# Patient Record
Sex: MALE | Race: WHITE | NOT HISPANIC OR LATINO | Employment: UNEMPLOYED | URBAN - METROPOLITAN AREA
[De-identification: names, ages, dates, MRNs, and addresses within clinical notes are randomized per-mention and may not be internally consistent; named-entity substitution may affect disease eponyms.]

---

## 2019-03-20 ENCOUNTER — TELEPHONE (OUTPATIENT)
Dept: TRANSPLANT | Facility: CLINIC | Age: 74
End: 2019-03-20

## 2019-03-20 NOTE — TELEPHONE ENCOUNTER
Mobility: walking, ADLS, and works when able. Eats without difficulty.    Practicing physician  Umbilical hernia     Spoke to his wife she will email copy of insurance cards.

## 2019-03-21 ENCOUNTER — TELEPHONE (OUTPATIENT)
Dept: TRANSPLANT | Facility: CLINIC | Age: 74
End: 2019-03-21

## 2019-03-21 NOTE — TELEPHONE ENCOUNTER
----- Message from Jamal Oswald sent at 3/21/2019  8:12 AM CDT -----  Have the patients demographic sheet which includes: patients name, mailing address, phone number and insurance information. We will check to see if patient medical records are in Care Everywhere:    By: Jamal Oswald

## 2019-03-28 ENCOUNTER — TELEPHONE (OUTPATIENT)
Dept: TRANSPLANT | Facility: CLINIC | Age: 74
End: 2019-03-28

## 2019-03-28 DIAGNOSIS — K74.69 COMPENSATED HCV CIRRHOSIS: ICD-10-CM

## 2019-03-28 DIAGNOSIS — B19.20 COMPENSATED HCV CIRRHOSIS: ICD-10-CM

## 2019-03-28 NOTE — TELEPHONE ENCOUNTER
Referral received from Self referral  .    Patient with  Hep C Cirrhosis. MELD 22   ICD-10-CM: B19.20, K74.69  Referred for liver transplant for EVALUATION.    Referral completed and forwarded to [facility].          Insurance:   PRIMARY: medicare  ID:[id number]      SECONDARY:[employer]  POLICY: [id number]  Contact #     [employer]  MEMBER ID: [id number]

## 2019-04-03 ENCOUNTER — TELEPHONE (OUTPATIENT)
Dept: TRANSPLANT | Facility: CLINIC | Age: 74
End: 2019-04-03

## 2019-04-03 NOTE — TELEPHONE ENCOUNTER
----- Message from Khadra Damian sent at 4/3/2019 12:46 PM CDT -----  Contact: Maegan diaz's wife  Needs Advice    Reason for call: Calling to check the status of the referral         Communication Preference: [phone]    Additional Information: N/A

## 2019-04-03 NOTE — TELEPHONE ENCOUNTER
Spoke with the patient's wife Maegan advising her that we are currently waiting on the insurance information.  Informed that once that has been received the information from the outside hospitals will be reviewed for the check list once that is completed an appointment will be offered.    The patient's wife stated that she understood the information provided.

## 2019-04-10 ENCOUNTER — TELEPHONE (OUTPATIENT)
Dept: TRANSPLANT | Facility: CLINIC | Age: 74
End: 2019-04-10

## 2019-04-10 NOTE — TELEPHONE ENCOUNTER
Pt called and informed we received financial clr for liver transplant evaluation. Spoke to wife, patient in back ground, reviewed.  Awa Mccurdy, RN  TR[facility]PL[facility]PAT[facility]    [id number] [address]    [place] [address] [phone]

## 2019-04-17 ENCOUNTER — TELEPHONE (OUTPATIENT)
Dept: PREADMISSION TESTING | Facility: HOSPITAL | Age: 74
End: 2019-04-17

## 2019-04-17 ENCOUNTER — TELEPHONE (OUTPATIENT)
Dept: TRANSPLANT | Facility: CLINIC | Age: 74
End: 2019-04-17

## 2019-04-17 NOTE — TELEPHONE ENCOUNTER
----- Message from Jamal Oswald sent at 4/17/2019  2:54 PM CDT -----  Pt needs tracee ppt on 5/8 for liver txp work up

## 2019-04-23 ENCOUNTER — TELEPHONE (OUTPATIENT)
Dept: TRANSPLANT | Facility: CLINIC | Age: 74
End: 2019-04-23

## 2019-04-23 NOTE — TELEPHONE ENCOUNTER
----- Message from Jamal Oswald sent at 4/23/2019  4:18 PM CDT -----  Contact: Patient Wife    Returned call to pt wife, but there was no answer. LVM for her to call back    ----- Message -----  From: Louisa Cuellar  Sent: 4/23/2019   2:20 PM  To: [facility] Liver Referral Pool    Needs Advice    Reason for call: Called to reschedule patient appts on 5/8-5/10.        Communication Preference: Maegan [phone]    Additional Information: n/a

## 2019-04-24 ENCOUNTER — TELEPHONE (OUTPATIENT)
Dept: TRANSPLANT | Facility: CLINIC | Age: 74
End: 2019-04-24

## 2019-04-24 DIAGNOSIS — Z76.82 ORGAN TRANSPLANT CANDIDATE: ICD-10-CM

## 2019-04-24 DIAGNOSIS — K74.60 CHRONIC HEPATITIS C WITH CIRRHOSIS: Primary | ICD-10-CM

## 2019-04-24 DIAGNOSIS — B18.2 CHRONIC HEPATITIS C WITH CIRRHOSIS: Primary | ICD-10-CM

## 2019-04-24 NOTE — TELEPHONE ENCOUNTER
----- Message from Jamal Oswald sent at 4/23/2019  5:00 PM CDT -----    Returned pt call and will re/mateus appt for the week of 5/15-5/17    ----- Message -----  From: Barbara Taylor  Sent: 4/23/2019   4:42 PM  To: [facility] Liver Referral Pool    Patient Returning Call from [facility]    Who Left Message for Patient: Jamal Oswald  Communication Preference: [phone]  Additional Information: pt can do appt on 5/15-17; 5/22-24 or 5/29-31--pls contact pt with best date

## 2019-04-26 DIAGNOSIS — K74.60 HEPATIC CIRRHOSIS, UNSPECIFIED HEPATIC CIRRHOSIS TYPE, UNSPECIFIED WHETHER ASCITES PRESENT: Primary | ICD-10-CM

## 2019-04-30 ENCOUNTER — TELEPHONE (OUTPATIENT)
Dept: TRANSPLANT | Facility: CLINIC | Age: 74
End: 2019-04-30

## 2019-04-30 NOTE — TELEPHONE ENCOUNTER
----- Message from Jamal Oswald sent at 4/30/2019 11:09 AM CDT -----    Called and sp to pt wife and went over appts [facility]'ed for 5/15-5/17. Will e-mail her a copy of the appts.

## 2019-05-14 ENCOUNTER — TELEPHONE (OUTPATIENT)
Dept: TRANSPLANT | Facility: CLINIC | Age: 74
End: 2019-05-14

## 2019-05-14 NOTE — TELEPHONE ENCOUNTER
Pt wants to bring med list in pt doesn't want to give medication over the phone he will bring the list in all other  questions are answered

## 2019-05-15 ENCOUNTER — DOCUMENTATION ONLY (OUTPATIENT)
Dept: TRANSPLANT | Facility: CLINIC | Age: 74
End: 2019-05-15

## 2019-05-15 ENCOUNTER — HOSPITAL ENCOUNTER (OUTPATIENT)
Dept: RADIOLOGY | Facility: CLINIC | Age: 74
Discharge: HOME | End: 2019-05-15
Attending: INTERNAL MEDICINE
Payer: MEDICARE

## 2019-05-15 ENCOUNTER — OFFICE VISIT (OUTPATIENT)
Dept: TRANSPLANT | Facility: CLINIC | Age: 74
End: 2019-05-15
Payer: MEDICARE

## 2019-05-15 ENCOUNTER — SOCIAL WORK (OUTPATIENT)
Dept: TRANSPLANT | Facility: CLINIC | Age: 74
End: 2019-05-15
Payer: MEDICARE

## 2019-05-15 ENCOUNTER — TELEPHONE (OUTPATIENT)
Dept: TRANSPLANT | Facility: CLINIC | Age: 74
End: 2019-05-15

## 2019-05-15 ENCOUNTER — HOSPITAL ENCOUNTER (OUTPATIENT)
Dept: PREADMISSION TESTING | Facility: HOSPITAL | Age: 74
Discharge: HOME | End: 2019-05-15
Attending: ANESTHESIOLOGY
Payer: MEDICARE

## 2019-05-15 ENCOUNTER — NUTRITION (OUTPATIENT)
Dept: TRANSPLANT | Facility: CLINIC | Age: 74
End: 2019-05-15
Payer: MEDICARE

## 2019-05-15 VITALS
HEART RATE: 58 BPM | BODY MASS INDEX: 25.71 KG/M2 | DIASTOLIC BLOOD PRESSURE: 63 MMHG | TEMPERATURE: 98 F | RESPIRATION RATE: 18 BRPM | HEIGHT: 73 IN | OXYGEN SATURATION: 99 % | SYSTOLIC BLOOD PRESSURE: 141 MMHG | WEIGHT: 194 LBS

## 2019-05-15 VITALS
OXYGEN SATURATION: 100 % | SYSTOLIC BLOOD PRESSURE: 133 MMHG | HEIGHT: 73 IN | WEIGHT: 195.13 LBS | WEIGHT: 195.13 LBS | HEIGHT: 73 IN | RESPIRATION RATE: 17 BRPM | DIASTOLIC BLOOD PRESSURE: 55 MMHG | HEART RATE: 54 BPM | BODY MASS INDEX: 25.86 KG/M2 | BODY MASS INDEX: 25.86 KG/M2

## 2019-05-15 DIAGNOSIS — Z76.82 ORGAN TRANSPLANT CANDIDATE: Primary | ICD-10-CM

## 2019-05-15 DIAGNOSIS — Z76.82 ORGAN TRANSPLANT CANDIDATE: ICD-10-CM

## 2019-05-15 DIAGNOSIS — B19.20 COMPENSATED HCV CIRRHOSIS: ICD-10-CM

## 2019-05-15 DIAGNOSIS — K74.69 COMPENSATED HCV CIRRHOSIS: ICD-10-CM

## 2019-05-15 DIAGNOSIS — K74.60 CHRONIC HEPATITIS C WITH CIRRHOSIS: ICD-10-CM

## 2019-05-15 DIAGNOSIS — B18.2 CHRONIC HEPATITIS C WITH CIRRHOSIS: ICD-10-CM

## 2019-05-15 DIAGNOSIS — K74.69 DECOMPENSATED CIRRHOSIS RELATED TO HEPATITIS C VIRUS (HCV): ICD-10-CM

## 2019-05-15 DIAGNOSIS — B19.20 DECOMPENSATED CIRRHOSIS RELATED TO HEPATITIS C VIRUS (HCV): ICD-10-CM

## 2019-05-15 DIAGNOSIS — Z01.818 PRE-TRANSPLANT EVALUATION FOR LIVER TRANSPLANT: Primary | ICD-10-CM

## 2019-05-15 PROBLEM — D61.818 PANCYTOPENIA: Status: ACTIVE | Noted: 2019-05-15

## 2019-05-15 PROBLEM — N20.0 RENAL CALCULI: Status: ACTIVE | Noted: 2019-05-15

## 2019-05-15 PROBLEM — N40.1 BENIGN PROSTATIC HYPERPLASIA WITH LOWER URINARY TRACT SYMPTOMS: Status: ACTIVE | Noted: 2019-05-15

## 2019-05-15 LAB
AMPHET+METHAMPHET UR QL: NEGATIVE
BACTERIA #/AREA URNS AUTO: ABNORMAL /HPF
BARBITURATES UR QL SCN>200 NG/ML: NEGATIVE
BENZODIAZ UR QL SCN>200 NG/ML: NEGATIVE
BILIRUB UR QL STRIP: NEGATIVE
BZE UR QL SCN: NEGATIVE
CANNABINOIDS UR QL SCN: NEGATIVE
CLARITY UR REFRACT.AUTO: CLEAR
COLOR UR AUTO: YELLOW
CREAT UR-MCNC: 43 MG/DL (ref 23–375)
ETHANOL UR-MCNC: <10 MG/DL
GLUCOSE UR QL STRIP: NEGATIVE
HGB UR QL STRIP: ABNORMAL
HYALINE CASTS UR QL AUTO: 8 /LPF
KETONES UR QL STRIP: NEGATIVE
LEUKOCYTE ESTERASE UR QL STRIP: NEGATIVE
METHADONE UR QL SCN>300 NG/ML: NEGATIVE
MICROSCOPIC COMMENT: ABNORMAL
NITRITE UR QL STRIP: NEGATIVE
OPIATES UR QL SCN: NEGATIVE
PCP UR QL SCN>25 NG/ML: NEGATIVE
PH UR STRIP: 6 [PH] (ref 5–8)
PROT UR QL STRIP: NEGATIVE
RBC #/AREA URNS AUTO: 6 /HPF (ref 0–4)
SP GR UR STRIP: 1.01 (ref 1–1.03)
TOXICOLOGY INFORMATION: NORMAL
URN SPEC COLLECT METH UR: ABNORMAL
WBC #/AREA URNS AUTO: 1 /HPF (ref 0–5)

## 2019-05-15 PROCEDURE — 99205 OFFICE O/P NEW HI 60 MIN: CPT | Mod: S$PBB,TXP,, | Performed by: INTERNAL MEDICINE

## 2019-05-15 PROCEDURE — 77080 DXA BONE DENSITY AXIAL: CPT | Mod: 26,,, | Performed by: INTERNAL MEDICINE

## 2019-05-15 PROCEDURE — 77080 DEXA BONE DENSITY SPINE HIP: ICD-10-PCS | Mod: 26,,, | Performed by: INTERNAL MEDICINE

## 2019-05-15 PROCEDURE — 77080 DXA BONE DENSITY AXIAL: CPT | Mod: TC,TXP

## 2019-05-15 PROCEDURE — 99999 PR PBB SHADOW E&M-EST. PATIENT-LVL I: CPT | Mod: PBBFAC,TXP,, | Performed by: DIETITIAN, REGISTERED

## 2019-05-15 PROCEDURE — 99214 OFFICE O/P EST MOD 30 MIN: CPT | Mod: PBBFAC,25,27,TXP | Performed by: INTERNAL MEDICINE

## 2019-05-15 PROCEDURE — 80307 DRUG TEST PRSMV CHEM ANLYZR: CPT | Mod: TXP

## 2019-05-15 PROCEDURE — 99999 PR PBB SHADOW E&M-EST. PATIENT-LVL I: ICD-10-PCS | Mod: PBBFAC,TXP,, | Performed by: DIETITIAN, REGISTERED

## 2019-05-15 PROCEDURE — 99205 PR OFFICE/OUTPT VISIT, NEW, LEVL V, 60-74 MIN: ICD-10-PCS | Mod: S$PBB,TXP,, | Performed by: INTERNAL MEDICINE

## 2019-05-15 PROCEDURE — 99999 PR PBB SHADOW E&M-EST. PATIENT-LVL IV: ICD-10-PCS | Mod: PBBFAC,TXP,, | Performed by: INTERNAL MEDICINE

## 2019-05-15 PROCEDURE — 81001 URINALYSIS AUTO W/SCOPE: CPT | Mod: TXP

## 2019-05-15 PROCEDURE — 99211 OFF/OP EST MAY X REQ PHY/QHP: CPT | Mod: PBBFAC,25,TXP | Performed by: DIETITIAN, REGISTERED

## 2019-05-15 PROCEDURE — 99999 PR PBB SHADOW E&M-EST. PATIENT-LVL IV: CPT | Mod: PBBFAC,TXP,, | Performed by: INTERNAL MEDICINE

## 2019-05-15 PROCEDURE — 97802 MEDICAL NUTRITION INDIV IN: CPT | Mod: PBBFAC,TXP | Performed by: DIETITIAN, REGISTERED

## 2019-05-15 RX ORDER — RIFAXIMIN 550 MG/1
550 TABLET ORAL 2 TIMES DAILY
COMMUNITY
Start: 2019-05-02

## 2019-05-15 RX ORDER — SPIRONOLACTONE 100 MG/1
TABLET, FILM COATED ORAL
Refills: 3 | COMMUNITY
Start: 2019-04-09

## 2019-05-15 RX ORDER — GUAIFENESIN 1200 MG
325 TABLET, EXTENDED RELEASE 12 HR ORAL EVERY 6 HOURS PRN
COMMUNITY

## 2019-05-15 RX ORDER — CALCIUM CARB/VITAMIN D3/VIT K1 500-500-40
10000 TABLET,CHEWABLE ORAL
COMMUNITY
End: 2019-05-16 | Stop reason: DRUGHIGH

## 2019-05-15 RX ORDER — CYANOCOBALAMIN (VITAMIN B-12) 1000 MCG
1 TABLET, EXTENDED RELEASE ORAL
COMMUNITY

## 2019-05-15 RX ORDER — METOLAZONE 2.5 MG/1
2.5 TABLET ORAL DAILY PRN
COMMUNITY
Start: 2019-02-27

## 2019-05-15 RX ORDER — UREA 10 %
220 LOTION (ML) TOPICAL
COMMUNITY
End: 2019-05-16 | Stop reason: DRUGHIGH

## 2019-05-15 RX ORDER — LORATADINE 10 MG/1
10 TABLET ORAL DAILY PRN
COMMUNITY

## 2019-05-15 RX ORDER — IBUPROFEN 100 MG/5ML
2000 SUSPENSION, ORAL (FINAL DOSE FORM) ORAL DAILY
COMMUNITY

## 2019-05-15 RX ORDER — LACTULOSE 10 G/10G
15 SOLUTION ORAL
COMMUNITY
End: 2019-05-16 | Stop reason: DRUGHIGH

## 2019-05-15 RX ORDER — MAGNESIUM 200 MG
200 TABLET ORAL DAILY
COMMUNITY

## 2019-05-15 RX ORDER — FUROSEMIDE 40 MG/1
40 TABLET ORAL DAILY
Refills: 3 | COMMUNITY
Start: 2019-04-09

## 2019-05-15 RX ORDER — PERPHENAZINE 16 MG
2 TABLET ORAL DAILY
COMMUNITY

## 2019-05-15 NOTE — TELEPHONE ENCOUNTER
----- Message from Barbara Taylor sent at 5/15/2019 11:33 AM CDT -----  Patient Returning Call from [facility]    Who Left Message for Patient: unkn/no msg  Communication Preference: [phone]  Additional Information:

## 2019-05-15 NOTE — Clinical Note
Please forward clinic note to primary hepatologist in [place].  Not currently listed in our system.  Cleared to complete transplant evaluation

## 2019-05-15 NOTE — PATIENT INSTRUCTIONS
You are undergoing liver transplant evaluation.    MELD score is 22    No change in diuretics but would avoid metolazone use at this time and follow up with primary hepatologist for labs in 1-2 weeks to closely monitor renal function    We will discuss your case in liver selection once evaluation is complete.    Recommend avoiding prostate surgery prior to transplantation unless absolutely necessary.    You may use tonic water for muscle cramps associated with diuretic use.     Return to clinic in 6 months

## 2019-05-15 NOTE — PROGRESS NOTES
Transplant Hepatology  Liver Transplant Recipient Evaluation      Consultation started: 5/15/2019 at 8:06 AM     Original Referring Provider: Aaareferral Self  Current Corresponding Physician:     FRANK Native Liver Diagnosis: Cirrhosis: Type C    Reason for Visit: evaluation for liver transplant     Subjective:     Roberto Izaguirre is a 73 y.o. 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  He is accompanied by his wife.      Patient reports being diagnosed with hepatitis C after being found to have abnormal liver tests during an insurance evaluation in the 1980s.  He states that symptoms of irritability and memory loss were also present.  Evaluation revealed presence of non-A-non-B viral hepatitis.  The patient underwent successful treatment with 48 weeks of interferon approximately 8 years ago.      Despite cure of chronic hepatitis C, the patient has experienced decompensation of his liver disease.  This includes ascites and volume overload.  He is currently on diuretic therapy with lasix 40mg, spironolactone 100mg daily and prn metolazone (using approximately every 10 days).  He reports no history of LVP but has undergone TIPS for volume issues in January 2013.  The patient has also had issues with hepatic hydrothorax and required chest tube placement in 2012 prior to TIPS.  HE has worsened since TIPS placement.  He is currently on lactulose with 5 BMs daily and rifaximin.  Variceal bleed in 2004 and 2012.  He has not had further bleeding following TIPS.   The patient is also known to have nonocclusive PVT.      The patient has had 3 TIPS revision with most recent in January 2019.  Most recent US with increased velocities and concern for TIPS stenosis.      The patient has had 9 hospitalizations since November 2018.  His wife reports recurrent admissions for HE and recently precipitated by E coli bacteremia.      The patient resides in [facility].  He has been listed for liver transplant at [facility].  Has had 1 admission for liver transplant over the last 6 months but cancelled due to donor quality.  General MELD score is 19-21 with significant rise during episodes of infection or other precipitants for HE.   Also planned for evaluation at [facility] next month.      PMH:  BPH  Renal calculi  Umbilical hernia  Decompensated hepatitis C cirrhosis  CKD, baseline creatinine 1.5  Pancytopenia related to liver disease   PVT    PSH:    R inguinal hernia repair  Vasectomy   Tonsillectomy  Placement of urolift   No upper abdominal surgeries    FH: no liver disease     SH:   , lives together   General surgeon, retired from practice a few months ago  No tobacco, alcohol, or illicit drugs  (CBD use)     Review of Systems   Constitutional: Positive for fatigue. Negative for activity change, appetite change, chills and unexpected weight change.   HENT: Negative for hearing loss and sore throat.    Eyes: Negative for visual disturbance.   Respiratory: Negative for shortness of breath.    Cardiovascular: Positive for leg swelling. Negative for chest pain.   Gastrointestinal: Positive for abdominal distention. Negative for abdominal pain, nausea and vomiting.   Genitourinary: Positive for difficulty urinating.   Musculoskeletal: Negative for gait problem.   Skin: Negative for rash.   Neurological: Negative for weakness and headaches.   Hematological: Negative for adenopathy. Does not bruise/bleed easily.   Psychiatric/Behavioral: Positive for confusion and decreased concentration.       Objective:   Physical Exam   Constitutional: He is oriented to person, place, and time. He appears well-developed and well-nourished. No distress.   Ambulates and gets onto exam table without assistance   HENT:   Head: Normocephalic and atraumatic.   Mouth/Throat: Oropharynx is clear and moist. No oropharyngeal exudate.   Eyes: Pupils are equal, round, and reactive to light. Conjunctivae are normal. Scleral icterus  is present.   Neck: Normal range of motion. Neck supple. No thyromegaly present.   Cardiovascular: Normal rate, regular rhythm and normal heart sounds. Exam reveals no gallop and no friction rub.   No murmur heard.  Pulmonary/Chest: Effort normal and breath sounds normal. No respiratory distress. He has no wheezes. He has no rales.   Abdominal: Soft. Bowel sounds are normal. He exhibits distension. There is no tenderness. A hernia (umbilical, reducible) is present.   Musculoskeletal: Normal range of motion. He exhibits edema (2+ BLE).   Lymphadenopathy:     He has no cervical adenopathy.   Neurological: He is alert and oriented to person, place, and time. No cranial nerve deficit.   Mental status normal, no asterixis   Skin: Skin is warm and dry. No erythema.   Psychiatric: He has a normal mood and affect. His behavior is normal.   Vitals reviewed.     MELD-Na score: 22 at 5/15/2019  7:30 AM  MELD score: 21 at 5/15/2019  7:30 AM  Calculated from:  Serum Creatinine: 1.7 mg/dL at 5/15/2019  7:30 AM  Serum Sodium: 135 mmol/L at 5/15/2019  7:30 AM  Total Bilirubin: 3.1 mg/dL at 5/15/2019  7:30 AM  INR(ratio): 1.6 at 5/15/2019  7:30 AM  Age: 73 years     Lab Results   Component Value Date     (H) 05/15/2019    BUN 49 (H) 05/15/2019    CREATININE 1.7 (H) 05/15/2019    CALCIUM 9.0 05/15/2019     (L) 05/15/2019    K 3.8 05/15/2019     05/15/2019    PROT 5.7 (L) 05/15/2019    CO2 21 (L) 05/15/2019    WBC 3.25 (L) 05/15/2019    RBC 3.33 (L) 05/15/2019    HGB 11.6 (L) 05/15/2019    HCT 33.8 (L) 05/15/2019    PLT 23 (LL) 05/15/2019     Lab Results   Component Value Date    ALBUMIN 2.8 (L) 05/15/2019    BILITOT 3.1 (H) 05/15/2019    AST 36 05/15/2019    ALT 29 05/15/2019    ALKPHOS 131 05/15/2019    LABPROT 15.8 (H) 05/15/2019    INR 1.6 (H) 05/15/2019    PSA 0.23 05/15/2019       Diagnostics: EMR reviewed    Transplant Hepatology - Candidacy   Assessment/Plan:     Transplant Candidacy: Roberto Izaguirre is a  73 y.o. male with ESLD secondary to hepatitis C here for evaluation for possible OLT.  In my opinion, he is a good candidate for liver transplant.  He will be presented to selection committee after all tests and evaluations are complete.    Patient has medical indications for transplant with multiple complications of portal hypertension including splenomegaly, pancytopenia, volume overload, hepatic hydrothorax and HE.  He is s/p TIPS and appears to have good function.    There are limited medical issues outside of his liver disease.  Increased surgical complexity with PVT and TIPS, this will be reviewed during surgical consultation.  There are no obvious psychosocial concerns.    CKD:  Patient has evidence of volume overload despite small rise in creatinine on current diuretic dose.  Will defer titration to primary hepatologist but would recommend check labs in 1-2 weeks to monitor if renal function is worsening with ongoing use of diuretic therapy.  Currently does not require consideration of liver-kidney transplant if renal function can be maintained.    Functional status:  Patient has BMI 25 and appropriate functional status.  Needs ongoing attention to good nutrition and continued physical activity.  No concerns at this time.     RTC in 6 months     Ness Cotto MD         [facility] Patient Status  Functional Status: 70% - Cares for self: unable to carry on normal activity or active work  Physical Capacity: No Limitations    Outside Records Request: none

## 2019-05-15 NOTE — PROGRESS NOTES
"TRANSPLANT NUTRITIONAL ASSESSMENT    Referring Provider: Ness Cotto MD    Reason for Visit: Pre-liver transplant work-up    Age: 73 y.o.  Sex: male    Patient Active Problem List   Diagnosis    Compensated HCV cirrhosis     No past medical history on file.  Lab Results   Component Value Date     (H) 05/15/2019    K 3.8 05/15/2019    ALBUMIN 2.8 (L) 05/15/2019    HGBA1C 6.4 (H) 05/15/2019    CALCIUM 9.0 05/15/2019     Other Pertinent Labs: none    Current Outpatient Medications   Medication Sig    acetaminophen 325 mg Cap Take by mouth.    alpha lipoic acid 600 mg Cap Take 2 capsules by mouth.    ascorbic acid, vitamin C, (VITAMIN C) 1000 MG tablet Take 500 mg by mouth.    CANNABIDIOL, CBD, EXTRACT ORAL Take by mouth.    cholecalciferol, vitamin D3, 400 unit Cap Take 10,000 Units by mouth.    furosemide (LASIX) 40 MG tablet     iron, carbonyl 45 mg Tab Take 1 tablet by mouth.    lactulose (CEPHULAC) 10 gram packet Take 15 g by mouth.    loratadine (CLARITIN) 10 mg tablet Take 10 mg by mouth.    magnesium 200 mg Tab Take by mouth once.    metOLazone (ZAROXOLYN) 2.5 MG tablet Take 2.5 mg by mouth.    mirabegron (MYRBETRIQ) 50 mg Tb24 Take by mouth.    multivitamin capsule Take 1 capsule by mouth once daily.    ranitidine (ZANTAC) 150 MG tablet Take 150 mg by mouth 2 (two) times daily.    spironolactone (ALDACTONE) 100 MG tablet     XIFAXAN 550 mg Tab     zinc sulfate 220 mg Tab tablet Take 220 mg by mouth.     No current facility-administered medications for this visit.      Allergies: Patient has no known allergies.    Ht Readings from Last 1 Encounters:   05/15/19 6' 1" (1.854 m)     Wt Readings from Last 1 Encounters:   05/15/19 88.5 kg (195 lb 1.7 oz)      BMI: Body mass index is 25.74 kg/m².    Usual Weight: 185-190 lb  Weight Change/Time: prior to fluid retention/fluctuation issues weight was around 175-180 lb  Current Diet: low sodium, vegetarian  Appetite/Current Intake: " poor  Exercise/Physical Activity: functional in ADLs, not much exercise/walking  Nutritional/Herbal Supplements: 1 Boost most days, may add pea protein powder to foods, Vit D, multi vit  Potential Food/Medication Interactions: no grapefruit - xifaxan  Chewing/Swallowing Problems: none  Symptoms: none  Assessment of Lab Values: Glu 210, Alb 2.8, Ha1c 6.4  Support System: spouse present and closely involved in pt's nutrition/diet regimen    Estimated Kcal Need: 4563-0970 kcal (25-30 kcal/kg)  Estimated Protein Need:  gm (1-1.5 gmk/kg)    Nutritional History:   Pt present with spouse today, they are here from [place]. Pt has been through other Tx evaluations before today. Pt reports having a poor appetite, occasional nausea, no vomiting/diarrhea/constipation. Pt is less interested in eating in general. Spouse cooks and tries to make various foods/meals pt may like. They have been following vegetarian diet for many years, also low sodium. Pt does not eat pickles, regular chips, canned items. Pt reported the following diet recall:  B: granola, almond milk, orange or banana or blueberries, or 2 eggs w/ toast or muffin, orange juice/green tea/1/2 cup coffee w/ sugar & 1/2&1/2  L: sandwich w/ gonzalez/veggie-meat/lettuce/tomato/avocado or pasta w/ vegetables  & beans, maybe PB &J sandwich; out to eat rarely Mexican/Mosotho/garden burger about 1 x/week, w/ Dr Pepper  D: beans, salad, pasta, lentil loaf or cottage cheese loaf  Beverages: water, green tea, juice & soda blend drink  Snack: Boost    Nutritional Diagnoses  Problem: food- and nutrition-related knowledge deficit  Etiology: r/t no prior edu on adequate protein intake/goal per day intake   Symptoms: aeb diet recall, questions    Educational Need? yes  Barriers: none identified  Discussed with: patient and spouse  Interventions: Patient taught nutrition information regarding Pre-liver transplant work-up.    Provided education on protein content in foods, goal intake per  day, suggestions for ways to reach protein intake goal; nutrition supplements, snacks.   Reinforced low sodium diet recommendations.  Goals/Recommendations: diet adherence, small frequent meals and snacks and consumption of aleah nutritional supplements  Actions Taken: instruct/provide written information  Strategies Used: problem solving, goal setting, motivational interviewing  Patient and/or family comprehend instructions: yes , adherence expected  Outcome: Verbalizes understanding  Monitoring:     Contact information provided, will f/u in clinic and communicate with the care team as needed.     Counseling Time: 30 minutes

## 2019-05-15 NOTE — PROGRESS NOTES
"Transplant Recipient Adult Psychosocial Assessment    SW completed assessment with pt and pt's spouse / primary caregiver in clinic.    Pt reports being activated on liver transplant wait lists by [facility] since  and by [facility] since .  Pt also reports being scheduled to start liver transplant evaluation at [facility] in [place], [place] starting on 19.    Roberto Izaguirre  [id number] [address]  Telephone Information:   Mobile [phone]   Home  [phone] (home)  Work  There is no work phone number on file.  E-mail  [email or web address]    Sex: male  YOB: 1945  Age: 73 y.o.    Encounter Date: 5/15/2019  [place] Citizen: yes  Primary Language: Portuguese   Needed: no    Emergency Contact:  Name: Maegan Izaguirre (Cherie)  Relationship: wife  Address: same as patient  Phone Numbers: [phone] (mobile)    Family/Social Support:   Number of dependents/: none - pt reports having 2 cats who are both indoor and outdoor pets.  Pt/spouse plans to make arrangements to have family / friends care for pets (temporarily or permanently) as necessary.  Marital history: Pt reports marrying once and has been  for 45 years.  Other family dynamics: Pt's parents are .  Pt has 2 adult children (son Ricardo Izaguirre who lives in [place] and daughter Addie Benito who lives in [place], [place]).  Pt has 3 grandchildren whom he does not see often due to pt and children living far apart.  Pt also has 3 siblings (2 are physicians) who live in [place] and [place], [place].    Household Composition:  Name: Roberto Izaguirre  Age: 73  Relationship: patient  Does person drive? no    Name: Maegan Beard" Dmitriy  Age: 68  Relationship: wife  Does person drive? yes    Do you and your caregivers have access to reliable transportation? yes  PRIMARY CAREGIVER: Maegan "Rebecca" Dmitriy, pt's wife, will be primary caregiver, phone " "number [phone].      provided in-depth information to patient and caregiver regarding pre- and post-transplant caregiver role.   strongly encourages patient and caregiver to have concrete plan regarding post-transplant care giving, including back-up caregiver(s) to ensure care giving needs are met as needed.    Patient and Caregiver states understanding all aspects of caregiver role/commitment and is able/willing/committed to being caregiver to the fullest extent necessary.    Patient and Caregiver verbalizes understanding of the education provided today and caregiver responsibilities.         remains available. Patient and Caregiver agree to contact  in a timely manner if concerns arise.      Able to take time off work without financial concerns: n/a - Pt's wife is retired from nursing.     Additional Significant Others who will Assist with Transplant:  Name: Kristianlannymoe "Arnav Swanson - confirmed caregiver availability via phone  Age: 68  City: [place] State: [place]  Relationship: friend  Does person drive? yes   Phone Number: [phone] (mobile)    Living Will: yes  Healthcare Power of : yes - Pt's wife is 1st listed HCPA and pt's son Ricardo Izaguirre is 2nd listed HCPA.  Advance Directives on file: <<no information> per medical record.  Verbally reviewed LW/HCPA information.   provided patient with copy of LW/HCPA documents and provided education on completion of forms.    Living Donors: No.    Highest Education Level: Post-College Graduate Degree - Pt is an MD and practices General Medicine (outpatient).  Reading Ability: college  Reports difficulty with: seeing - wears readers.  Pt also reports slower reading comprehension.  Learns Best By:  Combination of written, verbal, and demonstrated information     Status: no  VA Benefits: no     Working for Income: No  If no, reason not working: Demands of Treatment  Patient is " employed as outpatient physician practicing General Medicine.  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  Pt reports currently being on medical leave for the past 2 or 3 months due to liver disease and transplant workup.  Pt denies currently having any vacation time or STD/LTD benefits due to part-time employment status.    Spouse/Significant Other Employment: currently unemployed but has worked as a nurse in the past    Disabled: no    Monthly Income:   Reitrement: $2,415 (pt's income); pt's spouse reports own SSR income as $1,000 / month.  Pt and spouse also report each having an MIKALA ($6000 in pt's MIKALA and $5000 in wife's MIKALA).  Per spouse, pt also has about $25,000 in investments for financial backup support.  Able to afford all costs now and if transplanted, including medications: yes - pt reports getting prescription cost assistance through his employer ([employer]) for Xifaxan.  Pt's current monthly medication copays totaling $140 after having met deductible this year.  Patient and Caregiver verbalizes understanding of personal responsibilities related to transplant costs and the importance of having a financial plan to ensure that patients transplant costs are fully covered.      provided fundraising information/education.  Patient and Caregiver verbalizes understanding.   remains available.    Insurance:   Payor/Plan Subscr  Sex Relation Sub. Ins. ID Effective Group Num   1. MEDICARE - MEALEXSANDRA ADAMS 1945 Male Self [id number]                                    [address]   2. AETNA - MEDIC* ALEXSANDRA IBARRA 1945 Male  [id number]                                    [address] [address]     Primary Insurance (for UNOS reporting): Public Insurance - Medicare FFS (Fee For Service) - Medicare A&B with Hum[employer] Medicare Part D  Secondary  Insurance (for UNOS reporting): Public Insurance - Medicare FFS (Fee For Service) - Aetna Medicare Supplement  Patient and Caregiver verbalizes clear understanding that patient may experience difficulty obtaining and/or be denied insurance coverage post-surgery. This includes and is not limited to disability insurance, life insurance, health insurance, burial insurance, long term care insurance, and other insurances.    Patient and Caregiver also reports understanding that future health concerns related to or unrelated to transplantation may not be covered by patient's insurance.  Resources and information provided and reviewed.      Patient and Caregiver provides verbal permission to release any necessary information to outside resources for patient care and discharge planning.  Resources and information provided are reviewed.      Dialysis Adherence:  Pt denies having ever received dialysis treatment in the past.    Infusion Service: patient utilizing? no  Home Health: patient utilizing? no  DME: yes - pt owns a cane, walker, wheelchair, commode, bp cuff, and thermometer at home (all DME obtained from other family members)  Pulmonary/Cardiac Rehab: no   ADLS:  Pt reports being independent and not requiring any assistance to perform ADLs.  Pt reports ambulating independently without use of DME.  Pt does not drive.    Adherence:   Pt/caregiver reports pt has exhibited good adherence to medication regimen, appointment schedule, and medical recommendations in the past.  Adherence education and counseling provided.     Per History Section:  Past Medical History:   Diagnosis Date    BPH (benign prostatic hyperplasia)     Cirrhosis     Hepatitis     hep C s/p cure    Renal calculi      Social History     Tobacco Use    Smoking status: Never Smoker   Substance Use Topics    Alcohol use: Not Currently     Social History     Substance and Sexual Activity   Drug Use Never     Social History     Substance and Sexual  Activity   Sexual Activity Yes    Partners: Female       Per Today's Psychosocial:  Tobacco: none, patient denies any use ever  Alcohol: none, patient denies any use ever  Illicit Drugs/Non-prescribed Medications: none, patient denies any use ever    Patient and Caregiver states clear understanding of the potential impact of substance use as it relates to transplant candidacy and is aware of possible random substance screening.  Substance abstinence/cessation counseling, education and resources provided and reviewed.     Arrests/DWI/Treatment/Rehab: patient denies    Psychiatric History:    Mental Health: Pt denies history of any mental health symptoms or diagnoses.  Psychiatrist/Counselor: Pt denies ever receiving care from psychiatrist/counselor.  Medications:  Pt denies ever being prescribed medication for mental health treatment.  Suicide/Homicide Issues: Pt denies any past and/or present SI/HI.   Safety at home: Pt denies having any past or present concerns regarding safety at home including but not limited to physical/emotional/sexual abuse, neglect, or exploitation.    Knowledge: Patient and Caregiver states having clear understanding and realistic expectations regarding the potential risks and potential benefits of organ transplantation and organ donation, agrees to discuss with health care team members and support system members and to utilize available resources and express questions and/or concerns in order to further facilitate the pt informed decision-making.  Resources and information provided and reviewed.     Patient and Caregiver is aware of [facility]'s affiliation and/or partnership with agencies in home health care, LTAC, SNF, [facility] and other hospitals and clinics.    Understanding: Patient and Caregiver reports having a clear understanding of the many lifetime commitments involved with being a transplant recipient, including costs, compliance, medications, lab work, procedures, appointments,  "concrete and financial planning, preparedness, timely and appropriate communication of concerns, abstinence (ETOH, tobacco, illicit non-prescribed drugs), adherence to all health care team recommendations, support system and caregiver involvement, appropriate and timely resource utilization and follow-through, mental health counseling as needed/recommended, and patient and caregiver responsibilities.  Social Service Handbook, resources and detailed educational information provided and reviewed.  Educational information provided.    Patient and Caregiver also reports current and expected compliance with health care regime and states having a clear understanding of the importance of compliance.      Patient and Caregiver reports a clear understanding that risks and benefits may be involved with organ transplantation and with organ donation.      Patient and Caregiver also reports clear understanding that psychosocial risk factors may affect patient, and include but are not limited to feelings of depression, generalized anxiety, anxiety regarding dependence on others, post traumatic stress disorder, feelings of guilt and other emotional and/or mental concerns, and/or exacerbation of existing mental health concerns.  Detailed resources provided and discussed.     Patient and Caregiver agrees to access appropriate resources in a timely manner as needed and/or as recommended, and to communicate concerns appropriately.  Patient and Caregiver also reports a clear understanding of treatment options available.      reviewed education, provided additional information, and answered questions.    Feelings or Concerns: Pt expressed wish that he could "hurry up" and receive liver transplant.  Pt denies having any concerns related to liver transplant process at this time.    Coping: Pt reports having experienced slight increase in agitation and irritability since dealing with cirrhosis diagnosis but reports coping well " overall.  Pt's spouse confirmed pt has exhibited adequate coping in spite of illness.  Pt reports reading and yard work as coping activities.    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    Interview Behavior: Patient and Caregiver presents as alert and oriented x 4, pleasant, good eye contact, well groomed, recall good, concentration/judgement good, average intelligence, calm, communicative, cooperative and asking and answering questions appropriately.  Pt's wife/primary caregiver presents attentive, supportive, and actively engaged in pt's care.         Transplant Social Work - Candidacy  Assessment/Plan:     Psychosocial Suitability: Patient presents as a good candidate for liver transplant at this time. Based on psychosocial risk factors, patient presents as low risk, due to established caregiver plan, stable and commited supportive relationships, no history of substance abuse and/or mental health concerns, adequate insurance coverage and personal finances to cover transplant related costs, and realistic beliefs and expectations regarding post-liver transplant recovery.    Recommendations/Additional Comments:  - identify plan for relocation locally near hospital in the event patient is approved for transplant wait listing and awaiting liver offer (SW provided resources regarding potential local lodging options for relocation locally.)  - fundraising  - local lodging post-transplant    Moe Gonzalez

## 2019-05-15 NOTE — TELEPHONE ENCOUNTER
Returned call to pt. Issue had already been resolved. Informed patient that we would meet tomorrow in education. Understanding expressed. No other questions at this time.

## 2019-05-16 ENCOUNTER — CLINICAL SUPPORT (OUTPATIENT)
Dept: TRANSPLANT | Facility: CLINIC | Age: 74
End: 2019-05-16
Payer: MEDICARE

## 2019-05-16 ENCOUNTER — HOSPITAL ENCOUNTER (OUTPATIENT)
Dept: RADIOLOGY | Facility: HOSPITAL | Age: 74
Discharge: HOME | End: 2019-05-16
Attending: INTERNAL MEDICINE
Payer: MEDICARE

## 2019-05-16 VITALS
SYSTOLIC BLOOD PRESSURE: 139 MMHG | RESPIRATION RATE: 18 BRPM | RESPIRATION RATE: 18 BRPM | OXYGEN SATURATION: 100 % | TEMPERATURE: 98 F | HEART RATE: 52 BPM | WEIGHT: 189.63 LBS | HEART RATE: 52 BPM | BODY MASS INDEX: 25.68 KG/M2 | DIASTOLIC BLOOD PRESSURE: 61 MMHG | TEMPERATURE: 98 F | OXYGEN SATURATION: 100 % | BODY MASS INDEX: 25.68 KG/M2 | HEIGHT: 72 IN | SYSTOLIC BLOOD PRESSURE: 139 MMHG | DIASTOLIC BLOOD PRESSURE: 61 MMHG | WEIGHT: 189.63 LBS | HEIGHT: 72 IN

## 2019-05-16 DIAGNOSIS — B19.20 DECOMPENSATED CIRRHOSIS RELATED TO HEPATITIS C VIRUS (HCV): Primary | ICD-10-CM

## 2019-05-16 DIAGNOSIS — K74.69 DECOMPENSATED CIRRHOSIS RELATED TO HEPATITIS C VIRUS (HCV): Primary | ICD-10-CM

## 2019-05-16 DIAGNOSIS — Z76.82 ORGAN TRANSPLANT CANDIDATE: ICD-10-CM

## 2019-05-16 DIAGNOSIS — K74.60 CHRONIC HEPATITIS C WITH CIRRHOSIS: ICD-10-CM

## 2019-05-16 DIAGNOSIS — K74.60 HEPATIC CIRRHOSIS, UNSPECIFIED HEPATIC CIRRHOSIS TYPE, UNSPECIFIED WHETHER ASCITES PRESENT: ICD-10-CM

## 2019-05-16 DIAGNOSIS — B18.2 CHRONIC HEPATITIS C WITH CIRRHOSIS: ICD-10-CM

## 2019-05-16 PROCEDURE — 99999 PR PBB SHADOW E&M-EST. PATIENT-LVL III: ICD-10-PCS | Mod: PBBFAC,TXP,,

## 2019-05-16 PROCEDURE — 93975 US ABDOMEN COMP WITH DOPPLER (XPD): ICD-10-PCS | Mod: 26,TXP,, | Performed by: RADIOLOGY

## 2019-05-16 PROCEDURE — 71046 XR CHEST PA AND LATERAL: ICD-10-PCS | Mod: 26,,, | Performed by: RADIOLOGY

## 2019-05-16 PROCEDURE — 71046 X-RAY EXAM CHEST 2 VIEWS: CPT | Mod: 26,,, | Performed by: RADIOLOGY

## 2019-05-16 PROCEDURE — 99213 OFFICE O/P EST LOW 20 MIN: CPT | Mod: PBBFAC,25,27,TXP

## 2019-05-16 PROCEDURE — 74183 MRI ABD W/O CNTR FLWD CNTR: CPT | Mod: TC,TXP

## 2019-05-16 PROCEDURE — 25500020 PHARM REV CODE 255: Mod: TXP | Performed by: INTERNAL MEDICINE

## 2019-05-16 PROCEDURE — 99999 PR PBB SHADOW E&M-EST. PATIENT-LVL IV: ICD-10-PCS | Mod: PBBFAC,TXP,,

## 2019-05-16 PROCEDURE — 99999 PR PBB SHADOW E&M-EST. PATIENT-LVL III: CPT | Mod: PBBFAC,TXP,,

## 2019-05-16 PROCEDURE — 74183 MRI ABD W/O CNTR FLWD CNTR: CPT | Mod: 26,,, | Performed by: RADIOLOGY

## 2019-05-16 PROCEDURE — 99214 OFFICE O/P EST MOD 30 MIN: CPT | Mod: PBBFAC,TXP,25

## 2019-05-16 PROCEDURE — 93975 VASCULAR STUDY: CPT | Mod: TC,TXP

## 2019-05-16 PROCEDURE — 74183 MRI ABDOMEN W WO CONTRAST: ICD-10-PCS | Mod: 26,,, | Performed by: RADIOLOGY

## 2019-05-16 PROCEDURE — 71046 X-RAY EXAM CHEST 2 VIEWS: CPT | Mod: TC,TXP

## 2019-05-16 PROCEDURE — 93975 VASCULAR STUDY: CPT | Mod: 26,TXP,, | Performed by: RADIOLOGY

## 2019-05-16 PROCEDURE — A9585 GADOBUTROL INJECTION: HCPCS | Mod: TXP | Performed by: INTERNAL MEDICINE

## 2019-05-16 PROCEDURE — 99999 PR PBB SHADOW E&M-EST. PATIENT-LVL IV: CPT | Mod: PBBFAC,TXP,,

## 2019-05-16 RX ORDER — ZINC GLUCONATE 50 MG
50 TABLET ORAL DAILY
COMMUNITY

## 2019-05-16 RX ORDER — GADOBUTROL 604.72 MG/ML
10 INJECTION INTRAVENOUS
Status: COMPLETED | OUTPATIENT
Start: 2019-05-16 | End: 2019-05-16

## 2019-05-16 RX ORDER — MELATONIN 10 MG
1 TABLET, SUBLINGUAL SUBLINGUAL DAILY
COMMUNITY

## 2019-05-16 RX ORDER — LACTULOSE 10 G/15ML
20 SOLUTION ORAL; RECTAL DAILY
COMMUNITY
End: 2019-05-16 | Stop reason: SDUPTHER

## 2019-05-16 RX ADMIN — GADOBUTROL 10 ML: 604.72 INJECTION INTRAVENOUS at 01:05

## 2019-05-16 NOTE — PROGRESS NOTES
PRE EDUCATION TEACHING NOTE    Roberto Izaguirre was seen in clinic today.  Handbook on pre-liver transplant information (see outline below) was given to the patient and time was allowed for questions.  Patient's wife Maegan accompanied him.  Informed consent signed and written information given on selection criteria.      LIVER TRANSPLANT WORK-UP EDUCATION  I. NATIONAL REGISTRY LISTING  A. Information for listing  B. Regions  C. Per UNOS, can be listed at more than one center  II. SURGERY  A. Length  B. Complications: bleeding, infection  C. Central lines, drains, Faria catheter, incision, endotracheal tube, NG tube  D. Transfusions, cell saver  III. SHORT TERM RECOVERY  A. ICU, PICU, Hospital stay  IV. LONG TERM RECOVERY  A. Labs at home  B. Clinic visits  C. Complications: infection, rejection, readmissions  D. Normal immunity and immunosuppression  E. Incidence of re-admit in 1st year  V. REJECTION  A. Incidence  B. Treatment: Solumedrol, Prograf, Thymoglobulin (actions and side effects)  VI. IMMUNOSUPPRESSIVES  A. Prednisone  B. Imuran/Cellcept  C. Cyclosporin/Prograf  D. Rapamune  E. Need for lifetime compliance  F. Actions and side effects  G. Costs  VII. RECURRENCE OF VIRAL HEPATITIS

## 2019-05-16 NOTE — PROGRESS NOTES
PHARM.D. PRE-TRANSPLANT NOTE:    This patient's medication therapy was evaluated as part of his pre-transplant evaluation.      The following general pharmacologic concerns were noted: CBD oil (when taken enterally) inhibits CYP 3a4 and significantly interacts with tacrolimus; enteral ingestion is not recommended when taking tacrolimus concomitantly    The following pharmacologic concerns related to HCV therapy were noted:none      This patient's medication profile was reviewed for contraindications for DAA Hepatitis C therapy:    [x]  No current inducers of CYP 3A4 or PGP  [x]  No amiodarone on this patient's EMR profile in the last 24 months  [x]  No past or current atrial fibrillation on this patient's EMR profile       Current Outpatient Medications   Medication Sig Dispense Refill    acetaminophen 325 mg Cap Take 325 mg by mouth every 6 (six) hours as needed.       alpha lipoic acid 600 mg Cap Take 2 capsules by mouth once daily.       ascorbic acid, vitamin C, (VITAMIN C) 1000 MG tablet Take 2,000 mg by mouth once daily.       CANNABIDIOL, CBD, EXTRACT ORAL Place 25 mg under the tongue once daily.       cholecalciferol, vitamin D3, 10,000 unit Tab Take 1 tablet by mouth once daily.      furosemide (LASIX) 40 MG tablet Take 40 mg by mouth once daily.   3    iron, carbonyl 45 mg Tab Take 1 tablet by mouth every Mon, Wed, Fri.       lactulose (CHRONULAC) 10 gram/15 mL solution Take 20 mLs by mouth once daily.      loratadine (CLARITIN) 10 mg tablet Take 10 mg by mouth daily as needed.       magnesium 200 mg Tab Take 200 mg by mouth once daily.       metOLazone (ZAROXOLYN) 2.5 MG tablet Take 2.5 mg by mouth daily as needed.       mirabegron (MYRBETRIQ) 50 mg Tb24 Take 50 mg by mouth every evening.       multivitamin capsule Take 1 capsule by mouth once daily.      ranitidine (ZANTAC) 150 MG tablet Take 150 mg by mouth 2 (two) times daily.      spironolactone (ALDACTONE) 100 MG tablet   3    vit  A/vit C/vit E/zinc/copper (OCUVITE PRESERVISION ORAL) Take 1 tablet by mouth once daily.      XIFAXAN 550 mg Tab Take 550 mg by mouth 2 (two) times daily.       zinc gluconate 50 mg tablet Take 50 mg by mouth once daily.       No current facility-administered medications for this visit.          Currently Mr Izaguirre  is responsible for preparing / administering his own medications on a daily basis.  I am available for consultation and can be contacted, as needed by the other members of the Liver Transplant team.

## 2019-05-17 ENCOUNTER — EVALUATION (OUTPATIENT)
Dept: TRANSPLANT | Facility: CLINIC | Age: 74
End: 2019-05-17
Payer: MEDICARE

## 2019-05-17 ENCOUNTER — IMMUNIZATION (OUTPATIENT)
Dept: PHARMACY | Facility: CLINIC | Age: 74
End: 2019-05-17
Payer: MEDICARE

## 2019-05-17 ENCOUNTER — CLINICAL SUPPORT (OUTPATIENT)
Dept: INFECTIOUS DISEASES | Facility: CLINIC | Age: 74
End: 2019-05-17
Payer: MEDICARE

## 2019-05-17 ENCOUNTER — OFFICE VISIT (OUTPATIENT)
Dept: INFECTIOUS DISEASES | Facility: CLINIC | Age: 74
End: 2019-05-17
Payer: MEDICARE

## 2019-05-17 VITALS
OXYGEN SATURATION: 98 % | DIASTOLIC BLOOD PRESSURE: 63 MMHG | RESPIRATION RATE: 18 BRPM | SYSTOLIC BLOOD PRESSURE: 146 MMHG | BODY MASS INDEX: 26.03 KG/M2 | HEART RATE: 53 BPM | HEIGHT: 73 IN | TEMPERATURE: 98 F | WEIGHT: 196.44 LBS

## 2019-05-17 VITALS
TEMPERATURE: 98 F | HEIGHT: 72 IN | WEIGHT: 197.31 LBS | SYSTOLIC BLOOD PRESSURE: 138 MMHG | HEART RATE: 54 BPM | BODY MASS INDEX: 26.73 KG/M2 | DIASTOLIC BLOOD PRESSURE: 64 MMHG

## 2019-05-17 DIAGNOSIS — K74.69 DECOMPENSATED CIRRHOSIS RELATED TO HEPATITIS C VIRUS (HCV): ICD-10-CM

## 2019-05-17 DIAGNOSIS — B19.20 DECOMPENSATED CIRRHOSIS RELATED TO HEPATITIS C VIRUS (HCV): Primary | ICD-10-CM

## 2019-05-17 DIAGNOSIS — Z76.82 LIVER TRANSPLANT CANDIDATE: ICD-10-CM

## 2019-05-17 DIAGNOSIS — K74.69 DECOMPENSATED CIRRHOSIS RELATED TO HEPATITIS C VIRUS (HCV): Primary | ICD-10-CM

## 2019-05-17 DIAGNOSIS — B19.20 DECOMPENSATED CIRRHOSIS RELATED TO HEPATITIS C VIRUS (HCV): ICD-10-CM

## 2019-05-17 DIAGNOSIS — Z71.85 VACCINE COUNSELING: ICD-10-CM

## 2019-05-17 DIAGNOSIS — Z23 NEED FOR HEPATITIS A VACCINATION: ICD-10-CM

## 2019-05-17 DIAGNOSIS — Z23 NEED FOR HEPATITIS B VACCINATION: ICD-10-CM

## 2019-05-17 DIAGNOSIS — Z23 NEED FOR SHINGLES VACCINE: ICD-10-CM

## 2019-05-17 PROCEDURE — 99213 OFFICE O/P EST LOW 20 MIN: CPT | Mod: PBBFAC,27,TXP,25 | Performed by: INTERNAL MEDICINE

## 2019-05-17 PROCEDURE — 99204 PR OFFICE/OUTPT VISIT, NEW, LEVL IV, 45-59 MIN: ICD-10-PCS | Mod: S$PBB,,, | Performed by: INTERNAL MEDICINE

## 2019-05-17 PROCEDURE — 99203 OFFICE O/P NEW LOW 30 MIN: CPT | Mod: S$PBB,TXP,, | Performed by: TRANSPLANT SURGERY

## 2019-05-17 PROCEDURE — G0010 ADMIN HEPATITIS B VACCINE: HCPCS | Mod: PBBFAC,TXP

## 2019-05-17 PROCEDURE — 99999 PR PBB SHADOW E&M-EST. PATIENT-LVL III: CPT | Mod: PBBFAC,TXP,,

## 2019-05-17 PROCEDURE — 99999 PR PBB SHADOW E&M-EST. PATIENT-LVL III: ICD-10-PCS | Mod: PBBFAC,TXP,,

## 2019-05-17 PROCEDURE — 99999 PR PBB SHADOW E&M-EST. PATIENT-LVL III: CPT | Mod: PBBFAC,TXP,, | Performed by: INTERNAL MEDICINE

## 2019-05-17 PROCEDURE — 99203 PR OFFICE/OUTPT VISIT, NEW, LEVL III, 30-44 MIN: ICD-10-PCS | Mod: S$PBB,TXP,, | Performed by: TRANSPLANT SURGERY

## 2019-05-17 PROCEDURE — 90471 IMMUNIZATION ADMIN: CPT | Mod: PBBFAC,TXP

## 2019-05-17 PROCEDURE — 99213 OFFICE O/P EST LOW 20 MIN: CPT | Mod: PBBFAC,TXP,25

## 2019-05-17 PROCEDURE — 99204 OFFICE O/P NEW MOD 45 MIN: CPT | Mod: S$PBB,,, | Performed by: INTERNAL MEDICINE

## 2019-05-17 PROCEDURE — 99999 PR PBB SHADOW E&M-EST. PATIENT-LVL III: ICD-10-PCS | Mod: PBBFAC,TXP,, | Performed by: INTERNAL MEDICINE

## 2019-05-17 RX ORDER — LACTULOSE 10 G/15ML
20 SOLUTION ORAL; RECTAL DAILY
Qty: 200 ML | Refills: 0 | Status: SHIPPED | OUTPATIENT
Start: 2019-05-17 | End: 2019-05-27

## 2019-05-17 NOTE — PROGRESS NOTES
Pre Transplant Infectious Diseases Consult  Liver Transplant Recipient Evaluation    Requesting Physician: Yadiel    Reason for Visit:  Pre-transplant evaluation    History of Present Illness  73 y.o. male Liver disease currently being evaluated for Liver transplant.  Patient with history of Hepatitis C - underwent treatment with 48 week interferon therapy 2011 with cure.  Patient subsequently developed HepC cirrhosis c/b ascites, hepatic hydrothorax requiring chest tube placement 2012, variceal bleeds 2004/2012, s/p TIPS 1/2013.  Patient has been hospitalized about 9 times in since 2018 - usually for hepatic encephalopathy from infection.  Reportedly an infectious source is never identified but he resolves with empiric antibiotic therapy.    Patient denies any recent fever, chills, or infective illnesses.    1) Do you have a history of:         YES NO   Diabetes   []        [x]     Autoimmune disease  []        [x]   Cancer              []        [x]   Surgical Removal of Spleen []        [x]       2) Have you had recurrent infections:             YES NO  Sinus infections  []        [x]   Lung infections  []        [x]              Urinary Tract Infections []        [x]                                              Intestinal Infections  []        [x]      Skin Infections   []        [x]       Musculoskeletal Infections    []        [x]   Reproductive Infections []        [x]   Periodontal Disease  []        [x]        3)Have you ever had: YES     NO       Chicken Pox   [x]         []          Shingles   []         [x]            Orolabial Herpes             [x]         []          Genital Herpes  []         [x]           Genital Warts   []         [x]             Cytomegalovirus  []         [x]          Eyad-Barr Virus  []         [x]              Hepatitis A   [x]         []          Hepatitis B   []         [x]          Hepatitis C   [x]         []            Syphilis   []         [x]          Gonorrhea   []          [x]         Chlamydia    []         [x]           Parasites / worms  []         [x]         Fungal Infections  []         [x]         Bloodstream Infections []         [x]             4) Tuberculosis             YES NO  Exposure to person with active TB?  []         [x]   Have you ever had a positive PPD?      []         [x]   If yes, what treatment did you receive:     5) Travel    What states have you lived in for more than a month?  [place]    What countries have you visited for more than 2 weeks?      [place], [place]                            YES     NO  Did you have any associated infections?   []         [x]     6) Animal Exposure                  YES NO  Do you have pets living in your house?   [x]         []   If yes, describe:     Do you spend time or live on a farm?    []         [x]   If yes, which ones:  Horse ranch and chicken ranch     Do you have a fish tank?         []  [x]    Do you have a litter box?     []         [x]     Do you fish or hunt?      []         [x]   Do you clean or skin fish or animals?    []         [x]     Consume raw or undercooked meat, fish, shellfish?  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   []         [x]   Do you hike, camp, or spend time in wooded areas?  [x]         []       9) The patient's immunization history was reviewed.     Have you ever received:  YES NO DATES  Routine Childhood vaccines  [x]         []       Influenza vaccine   [x]         []     Prevnar    [x]         []     Pneumovax    [x]         []     Tetanus-diptheria -pertussis  [x]         []     Hepatitis A vaccine series       [x]         []     Hepatitis B vaccine series         [x]         []      Zoster vaccine    [x]         []              Review of Systems   Constitution: Positive for decreased appetite, malaise/fatigue  and weight gain. Negative for chills, fever and weight loss.   HENT: Positive for hoarse voice. Negative for congestion, ear pain, hearing loss, sore throat and tinnitus.    Eyes: Negative for blurred vision, redness and visual disturbance.   Cardiovascular: Positive for leg swelling. Negative for chest pain and palpitations.   Respiratory: Positive for shortness of breath. Negative for cough, hemoptysis and sputum production.    Hematologic/Lymphatic: Negative for adenopathy. Does not bruise/bleed easily.   Skin: Positive for dry skin. Negative for itching, rash and suspicious lesions.   Musculoskeletal: Positive for myalgias. Negative for back pain, joint pain and neck pain.   Gastrointestinal: Negative for abdominal pain, constipation, diarrhea, heartburn, nausea and vomiting.   Genitourinary: Positive for frequency and urgency. Negative for dysuria, flank pain, hematuria and hesitancy.   Neurological: Positive for weakness. Negative for dizziness, headaches, numbness and paresthesias.   Psychiatric/Behavioral: Positive for memory loss. Negative for depression. The patient has insomnia. The patient is not nervous/anxious.      Objective:   Physical Exam   Constitutional: He appears well-developed and well-nourished. No distress.   HENT:   Head: Normocephalic and atraumatic.   Eyes: Conjunctivae and EOM are normal.   Neck: Normal range of motion. Neck supple.   Cardiovascular: Normal rate.   Pulmonary/Chest: Effort normal. No respiratory distress.   Abdominal: Soft. He exhibits no distension.   +ascites   Musculoskeletal: Normal range of motion. He exhibits no edema.   Neurological:   Intermittently doses off   Skin: Skin is warm and dry. No rash noted. He is not diaphoretic. No erythema.   Psychiatric: He has a normal mood and affect. His behavior is normal.   Vitals reviewed.     Significant Labs Reviewed:    HepA Ab neg  HepBc Ab neg  HepBs Ag neg  HepBs Ab neg    HepC Ab pos    HIV neg  RPR neg  Quant gold  neg  Calli neg        Assessment   73-year-old male  - HepC cirrhosis c/b ascites/EV/HE s/p TIPS 2013  - Organ transplant candidate  - Vaccine counseling  - Need for hepatitis A/B vaccine    Plan:     Transplant Infectious Disease - Candidacy:   Based on available information, there are no identified significant barriers to transplantation from an infectious disease standpoint pending acceptable serologies.    -Quant gold  Final determination of transplant candidacy will be made once evaluation is complete and reviewed by the Transplant Selection Committee.      Counseling:  I discussed with the patient the risk for increased susceptibility to infections following transplantation including increased risk for infection right after transplant and if rejection should occur.    Specific guidance has been provided to the patient regarding the patients occupation, hobbies and activities to avoid future infectious complications including but not limited to avoiding undercooked meats and seafood, proper hygiene, and contact with animals.  The patients has been counseled on the importance of vaccinations including but not limited to a yearly flu vaccine.    Immunizations:  Based on the patients immunization history and serologies, immunizations were ordered:  - Hepatitis A 0, 6 months  - Hepatitis B 0, 1 months  - Shingrix 0, 2 months        The patient was encouraged to contact us about any problems that may develop after immunization and possible side effects were reviewed.     Vandana Marquez MD MPH  Infectious Diseases [facility]

## 2019-05-17 NOTE — PROGRESS NOTES
Transplant Surgery  Liver Transplant Recipient Evaluation    Referring Provider: Aaareferral Self    Subjective:     Reason for Visit: evaluation for liver transplant    History of Present Illness: Roberto Izaguirre is a 73 y.o. male who is being evaluated for liver transplant due to Cirrhosis: Type C. Roberto reports ascites (diuretic-dependent), edema, esophageal or gastric varices, fatigue, hepatic hydrothorax, jaundice, muscle wasting and portal hypertension.    Review of Systems   Constitutional: Positive for activity change and fatigue.   Respiratory: Negative.    Cardiovascular: Negative.    Gastrointestinal: Positive for abdominal distention.   All other systems reviewed and are negative.      Objective:     Physical Exam   Pulmonary/Chest: Effort normal.   Abdominal: He exhibits ascites. A hernia is present.   Umbilical hernia (1 cms)   Moderate - severe ascites   Vitals reviewed.      MELD-Na score: 22 at 5/15/2019  7:30 AM  MELD score: 21 at 5/15/2019  7:30 AM  Calculated from:  Serum Creatinine: 1.7 mg/dL at 5/15/2019  7:30 AM  Serum Sodium: 135 mmol/L at 5/15/2019  7:30 AM  Total Bilirubin: 3.1 mg/dL at 5/15/2019  7:30 AM  INR(ratio): 1.6 at 5/15/2019  7:30 AM  Age: 73 years    Diagnoses:  No diagnosis found.    Transplant Surgery - Candidacy   Assessment/Plan:     Transplant Candidacy: Roberto Izaguirre is a 73 y.o. male with ESLD secondary to Cirrhosis: Type C here for evaluation for possible OLT.  Based on available information, Roberto is a suitable liver transplant candidate.  He will be presented to selection committee after all tests and evaluations are complete.    Babatunde Wiseman MD       [facility] Patient Status  Functional Status: 40% - Disabled: requires special care and assistance  Physical Capacity: No Limitations    Counseling: I discussed with Roberto the benefits of liver transplantation.  We discussed the evaluation and listing procedures.  We discussed the MELD system and the  associated waiting times.  We discussed national and center specific survival results.  We discussed the option of being multiply listed in different OPOs.  We discussed the option of living donation versus  donor transplantation and the advantages and relative disadvantages of each.   We discussed the risks, benefits and potential complications related to surgery including the risks related to anesthesia, bleeding, infection, primary non-function of the allograft, the risk of reoperation as well as the risk of death.  We discussed the typical post-operative course, length of hospitalization, the long-term use of immunosuppressive therapy as well as the need for long-term routine followup.    PHS: I discussed the use of organs from donors with PHS increased-risk behavior, including the testing protocols utilized, as well as data from the literature regarding the likelihood of transmission of hepatitis or HIV.  The patient is willing to consider such grafts.  DCD: I discussed the use of organs recovered by donation after cardiac death (DCD), including slightly decreased graft survival and greater risk of arterial and biliary complications. The potential advantage to the recipient is possibly receiving a transplant sooner by accepting such an organ. The patient is willing to consider such grafts.  HBcAb: I discussed the use of organs from donors with HBcAb in conjunction with long term use of HBV antiviral drugs, such as lamivudine. The small but measurable risk of hepatitis B seroconversion was discussed as well as the potentially life long need to continue antiviral drugs. The patient is willing to consider such grafts.  HCV Non-viremic recipient: I discussed the use of HCV-positive organs in naive recipients, including the risk of viral transmission to the patients or others, potential insurance barriers for antiviral medication coverage, risk for fibrosing cholestatic hepatitis, death or graft loss. The  potential advantage to the recipient is the possibility of receiving a transplant sooner with decreased mortality risk by accepting such an organ. The patient is willing to consider such grafts.  LDLT: I discussed the nature of living donor liver transplant, including donor risks and more frequent recipient complications. The patient is willing to consider such grafts.

## 2019-05-17 NOTE — LETTER
May 17, 2019      Ness Cotto MD  [address]  [place] [address]           [facility] - Infectious Diseases  [address]  [place] [address]  Phone: [phone]  Fax: [phone]          Patient: Roberto Izaguirre   MR Number: [id number]   YOB: 1945   Date of Visit: 5/17/2019       Dear Dr. Ness Cotto:    Thank you for referring Roberto Izaguirre to me for evaluation. Attached you will find relevant portions of my assessment and plan of care.    If you have questions, please do not hesitate to call me. I look forward to following Roberto Izaguirre along with you.    Sincerely,    Vandana Marquez MD    Enclosure  CC:  No Recipients    If you would like to receive this communication electronically, please contact [email or web address] or [phone] to request more information on [employer] Link access.    For providers and/or their staff who would like to refer a patient to [facility], please contact us through our one-stop-shop provider referral line, [facility] , at [phone].    If you feel you have received this communication in error or would no longer like to receive these types of communications, please e-mail [email or web address]

## 2019-05-21 ENCOUNTER — DOCUMENTATION ONLY (OUTPATIENT)
Dept: TRANSPLANT | Facility: CLINIC | Age: 74
End: 2019-05-21

## 2019-05-29 ENCOUNTER — PATIENT MESSAGE (OUTPATIENT)
Dept: TRANSPLANT | Facility: CLINIC | Age: 74
End: 2019-05-29

## 2019-06-11 ENCOUNTER — PATIENT MESSAGE (OUTPATIENT)
Dept: TRANSPLANT | Facility: CLINIC | Age: 74
End: 2019-06-11

## 2019-06-14 ENCOUNTER — PATIENT MESSAGE (OUTPATIENT)
Dept: TRANSPLANT | Facility: CLINIC | Age: 74
End: 2019-06-14

## 2019-06-17 ENCOUNTER — DOCUMENTATION ONLY (OUTPATIENT)
Dept: TRANSPLANT | Facility: CLINIC | Age: 74
End: 2019-06-17

## 2019-06-17 LAB
EXT ALBUMIN: 2.9
EXT ALKALINE PHOSPHATASE: 119
EXT ALT: 26
EXT AST: 28
EXT BASOPHILS (ABSOLUTE): 0.01
EXT BASOPHILS (ABSOLUTE): 0.02
EXT BILIRUBIN DIRECT: 0.7 MG/DL
EXT BILIRUBIN TOTAL: 2.2
EXT BILIRUBIN TOTAL: 2.2
EXT BUN: 64.6
EXT BUN: 65.1
EXT CALCIUM: 8.3
EXT CALCIUM: 8.5
EXT CHLORIDE: 101
EXT CHLORIDE: 105
EXT CO2: 19
EXT CO2: 20
EXT CREATININE: 1.48 MG/DL
EXT CREATININE: 1.52 MG/DL
EXT EOSINOPHILS (ABSOLUTE): 0.17
EXT EOSINOPHILS (ABSOLUTE): 0.2
EXT GFR MDRD AF AMER: 45
EXT GFR MDRD AF AMER: 54
EXT GFR MDRD NON AF AMER: 47
EXT GFR MDRD NON AF AMER: 53
EXT GLUCOSE: 312
EXT GLUCOSE: 418
EXT HEMATOCRIT: 27.4
EXT HEMATOCRIT: 28.1
EXT HEMOGLOBIN A1C: 6.5 %
EXT HEMOGLOBIN: 10.1
EXT HEMOGLOBIN: 9.9
EXT INR: 2
EXT LYMPHS (ABSOLUTE): 0.39
EXT LYMPHS (ABSOLUTE): 0.42
EXT MCV: 98.2
EXT MCV: 98.9
EXT MONOCYTES (ABSOLUTE): 0.39
EXT MONOCYTES (ABSOLUTE): 0.47
EXT NEUTROPHILS (ABSOLUTE): 1.55
EXT NEUTROPHILS (ABSOLUTE): 2.05
EXT PLATELETS: 22
EXT PLATELETS: 23
EXT POTASSIUM: 3.9
EXT POTASSIUM: 4.5
EXT PROTEIN TOTAL: 5.2
EXT PT: 23
EXT SODIUM: 133 MMOL/L
EXT SODIUM: 136 MMOL/L

## 2019-06-19 ENCOUNTER — TELEPHONE (OUTPATIENT)
Dept: TRANSPLANT | Facility: CLINIC | Age: 74
End: 2019-06-19

## 2019-06-19 ENCOUNTER — COMMITTEE REVIEW (OUTPATIENT)
Dept: TRANSPLANT | Facility: CLINIC | Age: 74
End: 2019-06-19

## 2019-06-19 NOTE — TELEPHONE ENCOUNTER
Called patient to notify him that he has been approved for listing for a liver transplant pending financial approval. Clinicals to be submitted to . Will call pt when financial clearance is obtained to schedule labs. Understanding expressed. No other questions at this time.

## 2019-06-20 ENCOUNTER — TELEPHONE (OUTPATIENT)
Dept: TRANSPLANT | Facility: CLINIC | Age: 74
End: 2019-06-20

## 2019-06-24 ENCOUNTER — PATIENT MESSAGE (OUTPATIENT)
Dept: TRANSPLANT | Facility: CLINIC | Age: 74
End: 2019-06-24

## 2019-06-25 ENCOUNTER — TELEPHONE (OUTPATIENT)
Dept: TRANSPLANT | Facility: CLINIC | Age: 74
End: 2019-06-25

## 2019-06-25 ENCOUNTER — DOCUMENTATION ONLY (OUTPATIENT)
Dept: TRANSPLANT | Facility: CLINIC | Age: 74
End: 2019-06-25

## 2019-06-25 LAB
EXT ALBUMIN: 2.7
EXT ALKALINE PHOSPHATASE: 109
EXT ALT: 23
EXT AST: 29
EXT BASOPHIL%: 1.5
EXT BASOPHILS (ABSOLUTE): 30
EXT BILIRUBIN TOTAL: 2.3
EXT BUN: 61
EXT CALCIUM: 8.7
EXT CHLORIDE: 102
EXT CO2: 20
EXT CREATININE: 1.95 MG/DL
EXT EOSINOPHIL%: 5.6
EXT EOSINOPHILS (ABSOLUTE): 112
EXT GFR MDRD AF AMER: 39
EXT GFR MDRD NON AF AMER: 34
EXT GLUCOSE: 570
EXT HEMATOCRIT: 30.5
EXT HEMOGLOBIN: 10.6
EXT INR: 1.6
EXT LYMPH%: 12.1
EXT LYMPHS (ABSOLUTE): 242
EXT MCH: 35.8
EXT MCHC: 34.8
EXT MCV: 103
EXT MONOCYTES (ABSOLUTE): 304
EXT MONOCYTES%: 15.2
EXT MPV: 14.3
EXT NEUT%: 65.6
EXT NEUTROPHILS (ABSOLUTE): 1312
EXT PLATELETS: 20
EXT POTASSIUM: 4.5
EXT PROTEIN TOTAL: 5
EXT PT: 16.6
EXT RBC: 2.96
EXT RDW: 16.3
EXT SODIUM: 130 MMOL/L
EXT WBC: 2

## 2019-06-25 NOTE — TELEPHONE ENCOUNTER
Called pt/pt's wife to inform them that pt's labs were too old for listing. Labs for a MELD of 25 must be less than 48hrs old. Left detailed message to that effect & requesting pt to repeat labs.

## 2019-06-25 NOTE — TELEPHONE ENCOUNTER
"  LIVER WAIT LISTING NOTE    **NOTE:   IF ANY EXTERNAL LABS ARE USED FOR LISTING THE VALUES AND DATES MUST BE ENTERED IN EPIC TO GENERATE THIS NOTE**    Date of Financial clearance to list: 2019    N/[facility]:        Organ: Liver  Name:       Roberto Izaguirre    : 1945          Gender:     male      MRN#: [id number]                                 State of Permanent Residence:    [address] [place] [address]    Ethnicity: /White   Race:      White    CLINICAL INFORMATION       ABO  ABO Blood Group:   O POS     ABO Confirmation: (THESE DATES MUST BE PRIOR TO THE LIST DATE AND SUPPORTED BY SEPARATE LAB REPORTS)    Internal Results    Lab Results   Component Value Date    GROUPTRH O POS 05/15/2019     No results found for: ABO    External Results    Are either of these ABO results based on External Labs? yes  (If Yes, STOP and go to source document in Media Tab for verification).    VITALS  Height:    Ht Readings from Last 1 Encounters:   19 6' 0.6" (1.844 m)     Weight:    Wt Readings from Last 1 Encounters:   19 89.1 kg (196 lb 6.9 oz)       LIVER ORGAN INFORMATION  Candidate Medical Urgency Status: Active    Number of Previous Transplants: 0    MELD/PELD Data Collection:  Had dialysis twice, or 24 hours of CVVHD, within 1 week prior to the serum creatinine test: No  Encephalopathy: 1 - 2 Date: 2019  Ascites: moderate Date: 2019          MELD Score: 25    Lab Results   Component Value Date    EXTINR 1.6 (H) 2019    EXTCREATININ 1.95 (H) 2019    EXTSODIUM 130 (L) 2019    EXTBILITOTAL 2.3 (H) 2019    EXTALBUMIN 2.7 (L) 2019     Additional Organs: none  Kidney: No    If Kidney is "Yes" above, check Diagnosis and enter the medical eligibility below for a simultaneous liver/kidney:    Diagnosis: Chronic kidney disease (CKD) with measured or calculated GFR less than or equal to 60 ml/min for greater than 90 consecutive " days. At least one of the following must qualify for CKD:  Date Begun Dialysis CrCl (ml/min)  Must be < or = 30 eGFR (ml/min) Must be < or = 30    Yes/no:                    Diagnosis: Sustained acute kidney injury (must be confirmed at least once every 7 days). Please select at least one of the following criteria:  Date of test or treatment Dialysis received CrCl (ml/min) Must be < or = 30 eGFR (ml/min) Must be < or = 25 Number of days since previous test or treatment (must be less than or equal to 7 days)    Yes/No:                  Diagnosis: Metabolic disease, Check all diagnosis that apply:     Hyperoxaluria    Atypical hemolytic uremic syndrome (HUS) from mutations in factor H or factor I    Familial non-neuropathic systemic amyloidosis    Methylmalonic aciduria     Transplant nephrologist confirming candidate's most recent diagnosis for SLK: n/a               Will Recipient Accept?   Accept HBcAB Positive Organ:  yes  Accept HBV JUANA Positive Organ:  yes  Accept HCV Antibody Positive Organ: yes   Accept HCV JUANA Positive Organ:  yes                        Local: No                           Import: No  Accept DCD Organ:    yes  Minimum acceptable donor age:  5 years  Maximum acceptable donor age:  99 years  Minimum acceptable donor weight:  40 lbs    Maximum acceptable donor weight:  440 lb  Maximum miles the organ or  Recovery team will travel:   5000 miles    TCR Information    Citizenship: [place] Citizen   Country of permanent residence:   Year of entry to the [place]:   Highest education level: Post-College Graduate Degree    Patient on Life Support: No  Functional Status: 70% - cares for self: unable to carry on normal activity or active work  Working for income: No  If no, reason not working: Demands of Treatment  Previous Pancreas Islet Infusion - No  Source of payment: Public Insurance - Medicare FFS (Fee for service)  Diabetes: No  Any previous malignancy: No  Neoadjuvant Therapy: No  Has candidate ever had a  diagnosis of HCC: No    Liver Medical Factors  Previous abdominal surgery: No  Spontaneous Bacterial Peritonitis: Yes  History of Portal Vein Thrombosis: Yes  Transjugular Intrahepatic Portosystemic Shunt: Yes

## 2019-06-27 ENCOUNTER — PATIENT MESSAGE (OUTPATIENT)
Dept: TRANSPLANT | Facility: CLINIC | Age: 74
End: 2019-06-27

## 2019-06-28 ENCOUNTER — TELEPHONE (OUTPATIENT)
Dept: TRANSPLANT | Facility: CLINIC | Age: 74
End: 2019-06-28

## 2019-06-28 ENCOUNTER — PATIENT MESSAGE (OUTPATIENT)
Dept: TRANSPLANT | Facility: CLINIC | Age: 74
End: 2019-06-28

## 2019-06-28 ENCOUNTER — DOCUMENTATION ONLY (OUTPATIENT)
Dept: TRANSPLANT | Facility: CLINIC | Age: 74
End: 2019-06-28

## 2019-06-28 LAB
EXT ALBUMIN: 2.9
EXT BILIRUBIN TOTAL: 2.5
EXT CREATININE: 1.72 MG/DL
EXT GFR MDRD AF AMER: 45
EXT GFR MDRD NON AF AMER: 39
EXT INR: 2.1
EXT PT: 24.5
EXT SODIUM: 131 MMOL/L

## 2019-06-28 NOTE — TELEPHONE ENCOUNTER
"  LIVER WAIT LISTING NOTE    **NOTE:   IF ANY EXTERNAL LABS ARE USED FOR LISTING THE VALUES AND DATES MUST BE ENTERED IN EPIC TO GENERATE THIS NOTE**    Date of Financial clearance to list: 2019     [facility]:        Organ: Liver  Name:       Roberto Izaguirre    : 1945          Gender:     male      MRN#: [id number]                                 State of Permanent Residence:  [address] [place] [address]  Ethnicity: /White   Race:      White    CLINICAL INFORMATION       ABO  ABO Blood Group:   O POS     ABO Confirmation: (THESE DATES MUST BE PRIOR TO THE LIST DATE AND SUPPORTED BY SEPARATE LAB REPORTS)    Internal Results    Lab Results   Component Value Date    GROUPTRH O POS 05/15/2019     No results found for: ABO    External Results - [facility] (Care Everywhere)         Are either of these ABO results based on External Labs? yes  (If Yes, STOP and go to source document in Media Tab for verification).    VITALS  Height:    Ht Readings from Last 1 Encounters:   19 6' 0.6" (1.844 m)     Weight:    Wt Readings from Last 1 Encounters:   19 89.1 kg (196 lb 6.9 oz)       LIVER ORGAN INFORMATION  Candidate Medical Urgency Status: Active    Number of Previous Transplants: 0    MELD/PELD Data Collection:  Had dialysis twice, or 24 hours of CVVHD, within 1 week prior to the serum creatinine test: No  Encephalopathy: 1 - 2 Date: 2019  Ascites: moderate Date: 2019          MELD Score:  MELD-Na score: 22 at 2019  9:52 AM  MELD score: 21 at 2019  9:52 AM  Calculated from:  Serum Creatinine: 1.7 mg/dL at 2019  9:52 AM  Serum Sodium: 135 mmol/L at 5/15/2019  7:30 AM  Total Bilirubin: 3.1 mg/dL at 5/15/2019  7:30 AM  INR(ratio): 1.6 at 5/15/2019  7:30 AM  Age: 73 years  Lab Results   Component Value Date    ALBUMIN 2.8 (L) 05/15/2019     Lab Results   Component Value Date    EXTINR 2.1 2019    EXTCREATININ 1.72 (H) 2019    " "EXTSODIUM 131 (L) 06/28/2019    EXTBILITOTAL 2.5 (H) 06/28/2019    EXTALBUMIN 2.9 (L) 06/28/2019       Additional Organs: none  Kidney: No    If Kidney is "Yes" above, check Diagnosis and enter the medical eligibility below for a simultaneous liver/kidney:    Diagnosis: Chronic kidney disease (CKD) with measured or calculated GFR less than or equal to 60 ml/min for greater than 90 consecutive days. At least one of the following must qualify for CKD:  Date Begun Dialysis CrCl (ml/min)  Must be < or = 30 eGFR (ml/min) Must be < or = 30    Yes/no:                    Diagnosis: Sustained acute kidney injury (must be confirmed at least once every 7 days). Please select at least one of the following criteria:  Date of test or treatment Dialysis received CrCl (ml/min) Must be < or = 30 eGFR (ml/min) Must be < or = 25 Number of days since previous test or treatment (must be less than or equal to 7 days)    Yes/No:                  Diagnosis: Metabolic disease, Check all diagnosis that apply:     Hyperoxaluria    Atypical hemolytic uremic syndrome (HUS) from mutations in factor H or factor I    Familial non-neuropathic systemic amyloidosis    Methylmalonic aciduria     Transplant nephrologist confirming candidate's most recent diagnosis for SLK: n/a               ## Please submit a separate Kidney Listing note for combined Liver/Kidney patients. ##    Will Recipient Accept?   Accept HBcAB Positive Organ:  yes  Accept HBV JUANA Positive Organ:  yes  Accept HCV Antibody Positive Organ: yes   Accept HCV JUANA Positive Organ:  yes                        Local: No                           Import: No  Accept DCD Organ:    yes  Minimum acceptable donor age:  5 years  Maximum acceptable donor age:  99 years  Minimum acceptable donor weight:  40 lbs    Maximum acceptable donor weight:  440 lb  Maximum miles the organ or  Recovery team will travel:   5000 miles    TCR Information    Citizenship: [place] Citizen   Country of [facility] " residence:   Year of entry to the :   Highest education level: Post-College Graduate Degree    Patient on Life Support: No  Functional Status: 70% - cares for self: unable to carry on normal activity or active work  Working for income: No  If no, reason not working: Demands of Treatment  Previous Pancreas Islet Infusion - No  Source of payment: Public Insurance - Medicare FFS (Fee for service)  Diabetes: No  Any previous malignancy: No  Neoadjuvant Therapy: No  Has candidate ever had a diagnosis of HCC: No    Liver Medical Factors  Previous abdominal surgery: No  Spontaneous Bacterial Peritonitis: Yes  History of Portal Vein Thrombosis: Yes  Transjugular Intrahepatic Portosystemic Shunt: Yes

## 2019-07-05 ENCOUNTER — TELEPHONE (OUTPATIENT)
Dept: TRANSPLANT | Facility: CLINIC | Age: 74
End: 2019-07-05

## 2019-07-05 LAB
EXT ALBUMIN: 3.9 (ref 3.5–5)
EXT ALKALINE PHOSPHATASE: 92 (ref 40–129)
EXT ALT: 18 (ref 7–55)
EXT AST: 28 (ref 8–48)
EXT BILIRUBIN DIRECT: 0.6 MG/DL (ref 0–0.3)
EXT BILIRUBIN TOTAL: 2.6
EXT BUN: 46.4 (ref 8–24)
EXT CALCIUM: 8.6 (ref 8.8–10.2)
EXT CHLORIDE: 111 (ref 98–107)
EXT CREATININE: 1.88 MG/DL (ref 0.74–1.35)
EXT GFR MDRD AF AMER: 41
EXT GFR MDRD NON AF AMER: 35
EXT GLUCOSE: 207 (ref 70–140)
EXT HEMATOCRIT: 24.8 (ref 38.3–48.6)
EXT HEMOGLOBIN: 8.4 (ref 13.2–16.6)
EXT INR: 2.4 (ref 9–1.1)
EXT PLATELETS: 16 (ref 135–317)
EXT POTASSIUM: 4.2 (ref 3.6–5.2)
EXT PROTEIN TOTAL: 5.3 (ref 6.3–7.9)
EXT PT: 27.4 (ref 9.4–12.5)
EXT SODIUM: 141 MMOL/L (ref 135–145)

## 2019-07-05 NOTE — TELEPHONE ENCOUNTER
PATIENT NAME: Roberto Izaguirre  [facility] #: [id number]    Lab Results   Component Value Date    EXTINR 2.4 (A) 07/05/2019    EXTCREATININ 1.88 (A) 07/05/2019    EXTSODIUM 141 07/05/2019    EXTBILITOTAL 2.6 07/05/2019    EXTALBUMIN 3.9 07/05/2019     MELD 26  Encephalopathy: 1 - 2  Ascites: moderate  Dialysis: no     Recertification requestor: Olivia Honeycutt

## 2019-07-07 ENCOUNTER — PATIENT MESSAGE (OUTPATIENT)
Dept: TRANSPLANT | Facility: CLINIC | Age: 74
End: 2019-07-07

## 2019-07-08 ENCOUNTER — TELEPHONE (OUTPATIENT)
Dept: TRANSPLANT | Facility: CLINIC | Age: 74
End: 2019-07-08

## 2019-07-08 NOTE — TELEPHONE ENCOUNTER
Received portal message from pt's wife that pt has been transplanted at [facility] (7/5/19). Pt removed from wait-list. Transplant episode closed.

## 2023-12-18 NOTE — PROGRESS NOTES
received Hepatitis A(first dose) and Heplisav-B(first dose) vaccine. Tolerated well. Left unit in NAD.  
Liya Fuentes(Attending)